# Patient Record
Sex: MALE | Race: WHITE | NOT HISPANIC OR LATINO | Employment: OTHER | URBAN - METROPOLITAN AREA
[De-identification: names, ages, dates, MRNs, and addresses within clinical notes are randomized per-mention and may not be internally consistent; named-entity substitution may affect disease eponyms.]

---

## 2017-01-03 ENCOUNTER — OFFICE VISIT (OUTPATIENT)
Dept: PALLIATIVE MEDICINE | Facility: CLINIC | Age: 67
End: 2017-01-03
Payer: MEDICARE

## 2017-01-03 VITALS
HEART RATE: 80 BPM | DIASTOLIC BLOOD PRESSURE: 71 MMHG | HEIGHT: 73 IN | BODY MASS INDEX: 27.04 KG/M2 | WEIGHT: 204 LBS | SYSTOLIC BLOOD PRESSURE: 127 MMHG

## 2017-01-03 DIAGNOSIS — G89.29 CHRONIC PAIN OF RIGHT KNEE: Primary | ICD-10-CM

## 2017-01-03 DIAGNOSIS — M54.2 CHRONIC NECK PAIN: ICD-10-CM

## 2017-01-03 DIAGNOSIS — G89.29 CHRONIC NECK PAIN: ICD-10-CM

## 2017-01-03 DIAGNOSIS — M25.561 CHRONIC PAIN OF RIGHT KNEE: Primary | ICD-10-CM

## 2017-01-03 PROCEDURE — 99214 OFFICE O/P EST MOD 30 MIN: CPT | Performed by: NURSE PRACTITIONER

## 2017-01-03 ASSESSMENT — PAIN SCALES - GENERAL: PAINLEVEL: MILD PAIN (3)
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
## 2017-01-03 NOTE — MR AVS SNAPSHOT
After Visit Summary   1/3/2017    Liliya Mendez    MRN: [id number]           Patient Information     Date Of Birth          1950        Visit Information        Provider Department      1/3/2017 10:00 AM Ness Sim APRN [facility]        Today's Diagnoses     Chronic pain of right knee    -  1     Chronic neck pain           Care Instructions    PLAN:  1. Continue outside PHYSICAL THERAPY for the right knee  2. Medications:   1. Trial Voltaren gel to knees  2. May also trial Aspercreme with 4% lidocaine cream; this is found over-the-counter and may be used according to package instructions for topical pain relief  3. Procedures: may consider right knee genicular nerve block, call me if you wish to proceed  4. Follow-up with me in 8-10 weeks.      Nurse Triage line:  [phone]   Call this number with any questions or concerns. You may leave a detailed message anytime. Calls are typically returned Monday through Friday between 8 AM and 4:30 PM. We usually get back to you within 2 business days depending on the issue/request.       Medication refills:    For non-narcotic medications, call your pharmacy directly to request a refill. The pharmacy will contact the [facility] Management Center for authorization. Please allow 3-4 days for these refills to be processed.     For narcotic refills, call the nurse triage line or send a [employer] message. Please contact us 7-10 days before your refill is due. The message MUST include the name of the specific medication(s) requested and how you would like to receive the prescription(s). The options are as follows:    Pain Clinic staff can mail the prescription to your pharmacy. Please tell us the name of the pharmacy.    You may pick the prescription up at the Pain Clinic (tell us the location) or during a clinic visit with your pain provider    Pain Clinic staff can deliver the prescription to the [facility] pharmacy in the clinic building.  "Please tell us the location.      Scheduling number: [phone].  Call this number to schedule or change appointments.    We believe regular attendance is key to your success in our program.    Any time you are unable to keep your appointment we ask that you call us at least 24 hours in advance to let us know. This will allow us to offer the appointment time to another patient.             Follow-ups after your visit        Who to contact     If you have questions or need follow up information about today's clinic visit or your schedule please contact [facility] [place] directly at [phone].  Normal or non-critical lab and imaging results will be communicated to you by Calerahart, letter or phone within 4 business days after the clinic has received the results. If you do not hear from us within 7 days, please contact the clinic through Covarityt or phone. If you have a critical or abnormal lab result, we will notify you by phone as soon as possible.  Submit refill requests through Moka or call your pharmacy and they will forward the refill request to us. Please allow 3 business days for your refill to be completed.          Additional Information About Your Visit        Calerahart Information     Moka gives you secure access to your electronic health record. If you see a primary care provider, you can also send messages to your care team and make appointments. If you have questions, please call your primary care clinic.  If you do not have a primary care provider, please call [phone] and they will assist you.        Care EveryWhere ID     This is your Care EveryWhere ID. This could be used by other organizations to access your [place] medical records  [id number]        Your Vitals Were     Pulse Height BMI (Body Mass Index)             80 1.854 m (6' 1\") 26.92 kg/m2          Blood Pressure from Last 3 Encounters:   01/03/17 127/71   04/29/16 129/79   01/26/16 133/85    Weight from Last 3 Encounters: "   01/03/17 92.534 kg (204 lb)   04/29/16 90.266 kg (199 lb)   01/26/16 95.709 kg (211 lb)              Today, you had the following     No orders found for display         Today's Medication Changes          These changes are accurate as of: 1/3/17 11:02 AM.  If you have any questions, ask your nurse or doctor.               Start taking these medicines.        Dose/Directions    diclofenac 1 % Gel topical gel   Commonly known as:  VOLTAREN   Used for:  Chronic pain of right knee        Apply 4 grams to knees up to  four times daily as needed  using enclosed dosing card.   Quantity:  100 g   Refills:  1            Where to get your medicines      These medications were sent to [id number] - [place], [place] - [address]  [address] [address]     Phone:  [phone]    - diclofenac 1 % Gel topical gel             Primary Care Provider Office Phone # Fax #    Derrick Hansen [phone]       [facility] [address] [facility] [address]        Thank you!     Thank you for choosing [facility]  for your care. Our goal is always to provide you with excellent care. Hearing back from our patients is one way we can continue to improve our services. Please take a few minutes to complete the written survey that you may receive in the mail after your visit with us. Thank you!             Your Updated Medication List - Protect others around you: Learn how to safely use, store and throw away your medicines at www.disposemymeds.org.          This list is accurate as of: 1/3/17 11:02 AM.  Always use your most recent med list.                   Brand Name Dispense Instructions for use    aspirin 81 MG tablet      1 TABLET DAILY       B COMPLEX 1 PO      1 tablet daily       DEPAKOTE 500 MG EC tablet   Generic drug:  divalproex     60 tablet    Take 1 tablet (500 mg) by mouth 2 times daily       diclofenac 1 % Gel topical gel    VOLTAREN     100 g    Apply 4 grams to knees up to  four times daily as needed  using enclosed dosing card.       LAMOTRIGINE PO      Take 25 mg by mouth daily       losartan 25 MG tablet    COZAAR    90 tablet    Take 1 tablet (25 mg) by mouth daily       simvastatin 10 MG tablet    ZOCOR    90 tablet    Take 1 tablet (10 mg) by mouth daily       traMADol 50 MG tablet    ULTRAM    20 tablet    Take 1/2 to 1 tablet every 6 hours as needed for severe pain, begin with lowest dose (1/2 tab)       VITAMIN D PO      once daily

## 2017-01-03 NOTE — PATIENT INSTRUCTIONS
PLAN:  1. Continue outside PHYSICAL THERAPY for the right knee  2. Medications:   1. Trial Voltaren gel to knees  2. May also trial Aspercreme with 4% lidocaine cream; this is found over-the-counter and may be used according to package instructions for topical pain relief  3. Procedures: may consider right knee genicular nerve block, call me if you wish to proceed  4. Follow-up with me in 8-10 weeks.      Nurse Triage line:  [phone]   Call this number with any questions or concerns. You may leave a detailed message anytime. Calls are typically returned Monday through Friday between 8 AM and 4:30 PM. We usually get back to you within 2 business days depending on the issue/request.       Medication refills:    For non-narcotic medications, call your pharmacy directly to request a refill. The pharmacy will contact the [facility] Center for authorization. Please allow 3-4 days for these refills to be processed.     For narcotic refills, call the nurse triage line or send a youmag message. Please contact us 7-10 days before your refill is due. The message MUST include the name of the specific medication(s) requested and how you would like to receive the prescription(s). The options are as follows:    Pain Clinic staff can mail the prescription to your pharmacy. Please tell us the name of the pharmacy.    You may pick the prescription up at the Pain Clinic (tell us the location) or during a clinic visit with your pain provider    Pain Clinic staff can deliver the prescription to the [facility] pharmacy in the clinic building. Please tell us the location.      Scheduling number: [phone].  Call this number to schedule or change appointments.    We believe regular attendance is key to your success in our program.    Any time you are unable to keep your appointment we ask that you call us at least 24 hours in advance to let us know. This will allow us to offer the appointment time to another patient.

## 2017-01-03 NOTE — NURSING NOTE
"Chief Complaint   Patient presents with     Pain     Follow up        Initial /71 mmHg  Pulse 80  Ht 1.854 m (6' 1\")  Wt 92.534 kg (204 lb)  BMI 26.92 kg/m2 Estimated body mass index is 26.92 kg/(m^2) as calculated from the following:    Height as of this encounter: 1.854 m (6' 1\").    Weight as of this encounter: 92.534 kg (204 lb).  BP completed using cuff size: regular    Edgardo Mcneill MA      "

## 2017-06-21 ENCOUNTER — OFFICE VISIT (OUTPATIENT)
Dept: PALLIATIVE MEDICINE | Facility: CLINIC | Age: 67
End: 2017-06-21
Payer: MEDICARE

## 2017-06-21 VITALS
BODY MASS INDEX: 27.05 KG/M2 | DIASTOLIC BLOOD PRESSURE: 95 MMHG | WEIGHT: 205 LBS | HEART RATE: 65 BPM | SYSTOLIC BLOOD PRESSURE: 156 MMHG

## 2017-06-21 DIAGNOSIS — M79.18 MYOFASCIAL PAIN: ICD-10-CM

## 2017-06-21 DIAGNOSIS — M47.812 CERVICAL SPONDYLOSIS WITHOUT MYELOPATHY: ICD-10-CM

## 2017-06-21 DIAGNOSIS — M54.2 CHRONIC NECK PAIN: Primary | ICD-10-CM

## 2017-06-21 DIAGNOSIS — G89.29 CHRONIC NONINTRACTABLE HEADACHE, UNSPECIFIED HEADACHE TYPE: ICD-10-CM

## 2017-06-21 DIAGNOSIS — M54.81 OCCIPITAL NEURALGIA, UNSPECIFIED LATERALITY: ICD-10-CM

## 2017-06-21 DIAGNOSIS — G89.29 CHRONIC NECK PAIN: Primary | ICD-10-CM

## 2017-06-21 DIAGNOSIS — R51.9 CHRONIC NONINTRACTABLE HEADACHE, UNSPECIFIED HEADACHE TYPE: ICD-10-CM

## 2017-06-21 PROCEDURE — 99214 OFFICE O/P EST MOD 30 MIN: CPT | Performed by: NURSE PRACTITIONER

## 2017-06-21 ASSESSMENT — PAIN SCALES - GENERAL: PAINLEVEL: MILD PAIN (3)

## 2017-06-21 NOTE — NURSING NOTE
"Chief Complaint   Patient presents with     Pain       Initial BP (!) 156/95  Pulse 65  Wt 93 kg (205 lb)  BMI 27.05 kg/m2 Estimated body mass index is 27.05 kg/(m^2) as calculated from the following:    Height as of 1/3/17: 1.854 m (6' 1\").    Weight as of this encounter: 93 kg (205 lb).  Medication Reconciliation: complete     Edgardo Mcneill MA      "

## 2017-06-21 NOTE — MR AVS SNAPSHOT
After Visit Summary   6/21/2017    Liliya Mendez    MRN: [id number]           Patient Information     Date Of Birth          1950        Visit Information        Provider Department      6/21/2017 8:30 AM Ness Sim APRN [facility]        Today's Diagnoses     Chronic neck pain    -  1    Cervical spondylosis without myelopathy        Chronic nonintractable headache, unspecified headache type          Care Instructions    PLAN:  1. Continue home activities   2. Medications:   1. No medication changes, let me know if you need Voltaren gel  3. Procedures: none at present. If right sided headache remains, call or mychart me and I will place an order for occipital nerve block as discussed for headache pain  4. Follow-up with me in 12-16 weeks.       ----------------------------------------------------------------  Nurse Triage line:  [phone]   Call this number with any questions or concerns. You may leave a detailed message anytime. Calls are typically returned Monday through Friday between 8 AM and 4:30 PM. We usually get back to you within 2 business days depending on the issue/request.       Medication refills:    For non-narcotic medications, call your pharmacy directly to request a refill. The pharmacy will contact the [facility] Management Center for authorization. Please allow 3-4 days for these refills to be processed.     For narcotic refills, call the nurse triage line or send a [employer] message. Please contact us 7-10 days before your refill is due. The message MUST include the name of the specific medication(s) requested and how you would like to receive the prescription(s). The options are as follows:    Pain Clinic staff can mail the prescription to your pharmacy. Please tell us the name of the pharmacy.    You may pick the prescription up at the Pain Clinic (tell us the location) or during a clinic visit with your pain provider    Pain Clinic staff can deliver  the prescription to the [facility] pharmacy in the clinic building. Please tell us the location.      Scheduling number: [phone].  Call this number to schedule or change appointments.    We believe regular attendance is key to your success in our program.    Any time you are unable to keep your appointment we ask that you call us at least 24 hours in advance to let us know. This will allow us to offer the appointment time to another patient.               Follow-ups after your visit        Who to contact     If you have questions or need follow up information about today's clinic visit or your schedule please contact [facility] [place] directly at [phone].  Normal or non-critical lab and imaging results will be communicated to you by MyChart, letter or phone within 4 business days after the clinic has received the results. If you do not hear from us within 7 days, please contact the clinic through [employer] or phone. If you have a critical or abnormal lab result, we will notify you by phone as soon as possible.  Submit refill requests through Infoblox or call your pharmacy and they will forward the refill request to us. Please allow 3 business days for your refill to be completed.          Additional Information About Your Visit        Infoblox Information     Infoblox gives you secure access to your electronic health record. If you see a primary care provider, you can also send messages to your care team and make appointments. If you have questions, please call your primary care clinic.  If you do not have a primary care provider, please call [phone] and they will assist you.        Care EveryWhere ID     This is your Care EveryWhere ID. This could be used by other organizations to access your [facility] medical records  [id number]        Your Vitals Were     Pulse BMI (Body Mass Index)                65 27.05 kg/m2           Blood Pressure from Last 3 Encounters:   06/21/17 (!) 156/95   01/03/17  127/71   04/29/16 129/79    Weight from Last 3 Encounters:   06/21/17 93 kg (205 lb)   01/03/17 92.5 kg (204 lb)   04/29/16 90.3 kg (199 lb)              Today, you had the following     No orders found for display       Primary Care Provider Office Phone # Fax #    Derrick Hansen [phone]       [facility] [address]  [place] [address]        Equal Access to Services     [facility] Hadii aad ku hadasho Soomaali, waaxda luqadaha, qaybta kaalmada adeegyada, waxay idiin hayaan adeeg kharasabiha labill . So [facility] [phone].    ATENCIÓN: Si habla español, tiene a phipps disposición servicios gratuitos de asistencia lingüística. Llame al [phone].    We comply with applicable federal civil rights laws and [place] laws. We do not discriminate on the basis of race, color, national origin, age, disability sex, sexual orientation or gender identity.            Thank you!     Thank you for choosing [facility]  for your care. Our goal is always to provide you with excellent care. Hearing back from our patients is one way we can continue to improve our services. Please take a few minutes to complete the written survey that you may receive in the mail after your visit with us. Thank you!             Your Updated Medication List - Protect others around you: Learn how to safely use, store and throw away your medicines at www.disposemymeds.org.          This list is accurate as of: 6/21/17  9:07 AM.  Always use your most recent med list.                   Brand Name Dispense Instructions for use Diagnosis    aspirin 81 MG tablet      1 TABLET DAILY        B COMPLEX 1 PO      1 tablet daily        DEPAKOTE 500 MG EC tablet   Generic drug:  divalproex     60 tablet    Take 1 tablet (500 mg) by mouth 2 times daily        diclofenac 1 % Gel topical gel    VOLTAREN    100 g    Apply 4 grams to knees up to  four times daily as needed  using enclosed dosing card.    Chronic  pain of right knee       LAMOTRIGINE PO      Take 25 mg by mouth daily        losartan 25 MG tablet    COZAAR    90 tablet    Take 1 tablet (25 mg) by mouth daily    Elevated blood pressure (not hypertension), Adjustment disorder with mixed anxiety and depressed mood       simvastatin 10 MG tablet    ZOCOR    90 tablet    Take 1 tablet (10 mg) by mouth daily    Other and unspecified hyperlipidemia       traMADol 50 MG tablet    ULTRAM    20 tablet    Take 1/2 to 1 tablet every 6 hours as needed for severe pain, begin with lowest dose (1/2 tab)    Chronic neck pain, Myofascial pain       VITAMIN D PO      once daily
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
## 2017-06-21 NOTE — PATIENT INSTRUCTIONS
PLAN:  1. Continue home activities   2. Medications:   1. No medication changes, let me know if you need Voltaren gel  3. Procedures: none at present. If right sided headache remains, call or mychart me and I will place an order for occipital nerve block as discussed for headache pain  4. Follow-up with me in 12-16 weeks.       ----------------------------------------------------------------  Nurse Triage line:  [phone]   Call this number with any questions or concerns. You may leave a detailed message anytime. Calls are typically returned Monday through Friday between 8 AM and 4:30 PM. We usually get back to you within 2 business days depending on the issue/request.       Medication refills:    For non-narcotic medications, call your pharmacy directly to request a refill. The pharmacy will contact the [facility] for authorization. Please allow 3-4 days for these refills to be processed.     For narcotic refills, call the nurse triage line or send a AdoTube message. Please contact us 7-10 days before your refill is due. The message MUST include the name of the specific medication(s) requested and how you would like to receive the prescription(s). The options are as follows:    Pain Clinic staff can mail the prescription to your pharmacy. Please tell us the name of the pharmacy.    You may pick the prescription up at the Pain Clinic (tell us the location) or during a clinic visit with your pain provider    Pain Clinic staff can deliver the prescription to the [facility] pharmacy in the clinic building. Please tell us the location.      Scheduling number: [phone].  Call this number to schedule or change appointments.    We believe regular attendance is key to your success in our program.    Any time you are unable to keep your appointment we ask that you call us at least 24 hours in advance to let us know. This will allow us to offer the appointment time to another patient.

## 2017-10-02 ENCOUNTER — MYC MEDICAL ADVICE (OUTPATIENT)
Dept: PALLIATIVE MEDICINE | Facility: CLINIC | Age: 67
End: 2017-10-02

## 2017-10-04 NOTE — TELEPHONE ENCOUNTER
My chart message from pt:    Hi Ness, I hope things are going good for you. I'm still having major daily issues with vertigo, I'm also having more pain with my neck issue. Therapy  for the vertigo did no good at all. The nausea, dizziness and major headache are multiple daily issues. A resent MRI showed everything was normal. The headaches last all day every day and are on the back of my head, the same side as my neck problems, I know you don't deal with vertigo but do you think my neck problems could be associated with these vertigo issues. After 7 months of this I'm getting pretty desperate to try and figure anything out that could help me. I can certainly come in to see you if you think it's better for us to meet. I know you always take care of me and I always enjoy talking with you even though I'm there for health issues. So if you think you might have any thoughts on this please let me know. Thank you Ness and take care.    Liliya     Pt was last seen on 6/21/2017:  PLAN:  1. Continue home activities   2. Medications:   1. No medication changes, let me know if you need Voltaren gel  3. Procedures: none at present. If right sided headache remains, call or mychart me and I will place an order for occipital nerve block as discussed for headache pain  4. Follow-up with me in 12-16 weeks.   No future appointments listed. - Before respond to pt will send to provider as not sure how to respond to the vertigo questions.    Rajani Westbrook RN, [facility]  Pain Clinic Care Coordinator

## 2017-10-04 NOTE — TELEPHONE ENCOUNTER
Khadar Lovell-  Where did you attend therapy for the vertigo? I have some ideas about different therapy depending on whom you have seen before.   Also, is the headache pain similar to the pain you were having the last time I saw you? If so, we could begin with having you come in to see one of my partners for an occipital nerve block to see if that helps the headache at all.    If pain is different, then I think I should see you first.   Also. Please keep a headache diary including how long each headache lasts and any associated symptoms such as nausea, vomiting, sensitivity to light or sensitivity to sound, etc. As there are some other therapies that need this information first before we can proceed.     Thanks, and hang in there!  Ness HERBERT RN CNP, BRISA  [facility]    I sent the above Georama message to the patient.  Ness HERBERT RN CNP, BRISA  [facility]

## 2017-10-09 NOTE — TELEPHONE ENCOUNTER
Morgan sent to pt:  From: Liliya Mendez      Created: 10/8/2017 7:00 AM      Good morning Ness, thank you for the reply. Just a update, Dr. Wright sent me to [facility], it's right off [address]. I went through some testing Thursday and Friday. They think it might be related to migraines and vertigo but won't know till they start therapy 10/16. they also said the muscles on my neck on the right side are really tight which might contribute to the dizziness, nausea and headaches. They'll do some neck therapy along with some other therapy's to see what works best for me. My primary DR. Carter gave me some Sumatritan Succinate for the headaches which don't work. Would you know of anything else that I might try. I don't want to try a block until I try therapy for awhile to see if they can pin point what might be causing these issues. Might there be changes in my neck that could cause these issues. The headaches are on the right side as we discussed on my last visit always start on the lower right side then can migrate all over. As  you know I can't take pain meds. as they make me nauseated. I'm sorry for bothering you as I know how busy you are but really respect your opinions when it comes to my health. Have a great day Zachary Lovell      Routed to Ness Sim NP.    Chichi Beatty, MISAEL-BSN  [facility]

## 2017-10-11 NOTE — TELEPHONE ENCOUNTER
Khadar Lovell-    I am so happy to hear you are being seen at the [facility]. They do great work.    There are other migraine medications out there as well. Have you tried anything other than the Sumatriptan?     We could also get a new MRI of your neck as you have not had one done since 12/1/2014.      Also, I think it would be OK to start with the occipital nerve blocks as doing this may benefit your headaches and should not interfere with any therapy, just help to turn down headache pain from the back of your head.     We have tried medial branch blocks in the past in the neck (injections into the facet joints), but this was not very helpful. I would not entertain repeating this without an updated MRI of your neck to see what, if anything, has changed.     And, you are not bothering me at all. Thanks for checking inLiliya.    Best regards,  Ness HERBERT RN CNP, [place]  [facility] Center[facility]hadar Joseph I sent the above [employer] message to the patient.  Ness HERBERT RN CNP, GEORGINAP  [facility]

## 2017-10-18 NOTE — TELEPHONE ENCOUNTER
Pt read message on 10/11/2017 and no response back closing encounter.     Rajani Westbrook RN, [facility]  Pain Clinic Care Coordinator

## 2017-10-30 ENCOUNTER — MYC MEDICAL ADVICE (OUTPATIENT)
Dept: PALLIATIVE MEDICINE | Facility: CLINIC | Age: 67
End: 2017-10-30

## 2017-10-30 DIAGNOSIS — G89.29 CHRONIC NECK PAIN: Primary | ICD-10-CM

## 2017-10-30 DIAGNOSIS — M54.2 CHRONIC NECK PAIN: Primary | ICD-10-CM

## 2017-10-30 DIAGNOSIS — M47.812 CERVICAL SPONDYLOSIS WITHOUT MYELOPATHY: ICD-10-CM

## 2017-10-31 NOTE — TELEPHONE ENCOUNTER
My chart message from pt:    Khadar Arzola, you mentioned maybe having a MRI on my neck, I m thinking that might not be a bad idea. Do I need to see you first. ThankYou and have a great day     Message on October 11, 2017:    We could also get a new MRI of your neck as you have not had one done since 12/1/2014.       Sending to provider for order.     Rajani Westbrook RN, [facility]  Pain Clinic Care Coordinator

## 2017-11-07 NOTE — TELEPHONE ENCOUNTER
Khadar Lovell-    You are having ongoing back of the neck pain, correct? Do you have any heart or ear metal implants? Are you claustrophobic? Once I have these answers I will place an order for the neck MRI, it can be completed at [facility].    Thanks.  Ness HERBERT, RN CNP, FNP  [facility]    I sent the above Brainliket message to the patient.  Ness HERBERT RN CNP, FNP  [facility] [place]

## 2017-11-08 NOTE — TELEPHONE ENCOUNTER
Per patient myChart message:  From: Liliya Mendez      Created: 11/8/2017 3:28 PM      Hi Ness, yes it's the same neck issues only getting worse. I've had two MRI's of my head in the last 3 months to rule out any conditions related to my vertigo so I'm good to go. No implants, I'm not claustrophobic. Thanks Ness it will be interesting to see what has changed. Thank you for the reply. Have a great day.   Liliya      Routed to provider.    Chichi Beatty, RN-BSN  [facility]

## 2017-11-10 NOTE — TELEPHONE ENCOUNTER
Khadar Joseph I have signed the order for a cervical MRI to be done at [facility]. Please call  to schedule. Thanks.    Ness HERBERT RN CNP, [place]  [facility]    I have sent the above Post[employer]t message to the patient.     Ness HERBERT RN CNP, GE[place]  [facility] [place]

## 2017-11-16 ENCOUNTER — RADIANT APPOINTMENT (OUTPATIENT)
Dept: MRI IMAGING | Facility: CLINIC | Age: 67
End: 2017-11-16
Attending: NURSE PRACTITIONER
Payer: MEDICARE

## 2017-11-16 DIAGNOSIS — M47.812 CERVICAL SPONDYLOSIS WITHOUT MYELOPATHY: ICD-10-CM

## 2017-11-16 DIAGNOSIS — M54.2 CHRONIC NECK PAIN: ICD-10-CM

## 2017-11-16 DIAGNOSIS — G89.29 CHRONIC NECK PAIN: ICD-10-CM

## 2017-11-16 PROCEDURE — 72141 MRI NECK SPINE W/O DYE: CPT | Mod: TC

## 2017-11-22 ENCOUNTER — MYC MEDICAL ADVICE (OUTPATIENT)
Dept: PALLIATIVE MEDICINE | Facility: CLINIC | Age: 67
End: 2017-11-22

## 2017-11-22 NOTE — TELEPHONE ENCOUNTER
Per patient myChart message:  From: Liliya Mendez      Created: 11/22/2017 3:01 PM      Khadar Arzola, I had my MRI last week and haven't heard anything about the results, have you heard anything yet. Thank you Ness and have a wonderful Thanksgiving.   Liliya        Routed to provider.    Chichi Beatty, RN-BSN  [facility]

## 2017-11-24 NOTE — TELEPHONE ENCOUNTER
Khadar Lovell-    You have some pretty significant arthritis in multiple levels in your neck, most notable at C3-4 and C6-7. At C6-7, you have moderate spinal canal stenosisi (this means that there is less room for the spinal cord to pass through the canal, so there is some pressure on the spinal cord). At this same level, there is also pretty severe neural foraminal stenosis (meaning that the openings where the spinal nerves exit have less room to exit).   However, there has not been much interval change since the previous MRI, which I think is reasonable news.     We could do either a trial of a cervical epidural steroid injection OR I can send you to see a neurosurgeon to get their professional opinion. I do think that what is going on in your neck is likely causing your numbness and tingling in the arms/hands.     Let me know if you have questions or your preferences on how to proceed.     Thanks.  Ness HERBERT RN CNP, [place]  [facility]    I have sent the above [email or web address] message to the patient.     Ness HERBERT RN CNP, [place]  [facility]

## 2017-11-27 NOTE — TELEPHONE ENCOUNTER
My chart response from pt:    Khadar Arzola, I m going to make a appointment with you so we can sit and talk, I know you can get booked out but I d feel just to come in. ThankYou and I ll see you soon.  Liliya

## 2017-12-05 ENCOUNTER — OFFICE VISIT (OUTPATIENT)
Dept: PALLIATIVE MEDICINE | Facility: CLINIC | Age: 67
End: 2017-12-05
Payer: MEDICARE

## 2017-12-05 VITALS — HEART RATE: 54 BPM | SYSTOLIC BLOOD PRESSURE: 153 MMHG | DIASTOLIC BLOOD PRESSURE: 87 MMHG

## 2017-12-05 DIAGNOSIS — G89.29 CHRONIC NONINTRACTABLE HEADACHE, UNSPECIFIED HEADACHE TYPE: ICD-10-CM

## 2017-12-05 DIAGNOSIS — R51.9 CHRONIC NONINTRACTABLE HEADACHE, UNSPECIFIED HEADACHE TYPE: ICD-10-CM

## 2017-12-05 DIAGNOSIS — M50.30 DDD (DEGENERATIVE DISC DISEASE), CERVICAL: ICD-10-CM

## 2017-12-05 DIAGNOSIS — G89.29 CHRONIC NECK PAIN: Primary | ICD-10-CM

## 2017-12-05 DIAGNOSIS — M79.18 MYOFASCIAL PAIN: ICD-10-CM

## 2017-12-05 DIAGNOSIS — M47.812 CERVICAL SPONDYLOSIS WITHOUT MYELOPATHY: ICD-10-CM

## 2017-12-05 DIAGNOSIS — M54.2 CHRONIC NECK PAIN: Primary | ICD-10-CM

## 2017-12-05 PROCEDURE — 99214 OFFICE O/P EST MOD 30 MIN: CPT | Performed by: NURSE PRACTITIONER

## 2017-12-05 ASSESSMENT — PAIN SCALES - GENERAL: PAINLEVEL: MODERATE PAIN (4)
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
## 2017-12-05 NOTE — MR AVS SNAPSHOT
After Visit Summary   12/5/2017    Liliya Mendez    MRN: [id number]           Patient Information     Date Of Birth          1950        Visit Information        Provider Department      12/5/2017 11:00 AM Ness Sim APRN [facility] Instructions    PLAN:   1. Medications:   1. Continue lamotrigine as prescribed by your neurologist  2. Take Migrelief as recommended on the bottle, can find at [employer] or [employer]  2. Procedures: None at present. Let me know if there is worsening numbness and tingling, and I can place an order for Neurosurgery referral.   3. Ask your seizure doctor if they are okay with start of low dose Topamax. This is used for seizures and migraine prevention.   4. Follow-up with me in 10 weeks.     ----------------------------------------------------------------  Nurse Triage line:  [phone]   Call this number with any questions or concerns. You may leave a detailed message anytime. Calls are typically returned Monday through Friday between 8 AM and 4:30 PM. We usually get back to you within 2 business days depending on the issue/request.       Medication refills:    For non-narcotic medications, call your pharmacy directly to request a refill. The pharmacy will contact the [facility] for authorization. Please allow 3-4 days for these refills to be processed.     For narcotic refills, call the nurse triage line or send a Paystik message. Please contact us 7-10 days before your refill is due. The message MUST include the name of the specific medication(s) requested and how you would like to receive the prescription(s). The options are as follows:    Pain Clinic staff can mail the prescription to your pharmacy. Please tell us the name of the pharmacy.    You may pick the prescription up at the Pain Clinic (tell us the location) or during a clinic visit with your pain provider    Pain Clinic staff can deliver the prescription  to the [facility] pharmacy in the clinic building. Please tell us the location.      Scheduling number: [phone].  Call this number to schedule or change appointments.    We believe regular attendance is key to your success in our program.    Any time you are unable to keep your appointment we ask that you call us at least 24 hours in advance to let us know. This will allow us to offer the appointment time to another patient.               Follow-ups after your visit        Who to contact     If you have questions or need follow up information about today's clinic visit or your schedule please contact [facility] directly at [phone].  Normal or non-critical lab and imaging results will be communicated to you by Rekoohart, letter or phone within 4 business days after the clinic has received the results. If you do not hear from us within 7 days, please contact the clinic through ShipBobt or phone. If you have a critical or abnormal lab result, we will notify you by phone as soon as possible.  Submit refill requests through UserEvents or call your pharmacy and they will forward the refill request to us. Please allow 3 business days for your refill to be completed.          Additional Information About Your Visit        Rekoohart Information     UserEvents gives you secure access to your electronic health record. If you see a primary care provider, you can also send messages to your care team and make appointments. If you have questions, please call your primary care clinic.  If you do not have a primary care provider, please call [phone] and they will assist you.        Care EveryWhere ID     This is your Care EveryWhere ID. This could be used by other organizations to access your [facility] medical records  [id number]        Your Vitals Were     Pulse                   54            Blood Pressure from Last 3 Encounters:   12/05/17 153/87   06/21/17 (!) 156/95   01/03/17 127/71    Weight from Last 3  Encounters:   06/21/17 93 kg (205 lb)   01/03/17 92.5 kg (204 lb)   04/29/16 90.3 kg (199 lb)              Today, you had the following     No orders found for display         Today's Medication Changes          These changes are accurate as of: 12/5/17 11:57 AM.  If you have any questions, ask your nurse or doctor.               Stop taking these medicines if you haven't already. Please contact your care team if you have questions.     traMADol 50 MG tablet   Commonly known as:  ULTRAM                    Primary Care Provider Office Phone # Fax #    Derrick Hansen [phone]       [address] [place] [address]        Equal Access to Services     ACLEB JAVIER : Hadii char sherwoodo Sobryant, waaxda luqadaha, qaybta kaalmada adelorenayada, gonzalo andre . So [facility] [phone].    ATENCIÓN: Si habla español, tiene a phipps disposición servicios gratuitos de asistencia lingüística. Llame al [phone].    We comply with applicable federal civil rights laws and [place] laws. We do not discriminate on the basis of race, color, national origin, age, disability, sex, sexual orientation, or gender identity.            Thank you!     Thank you for choosing [facility]  for your care. Our goal is always to provide you with excellent care. Hearing back from our patients is one way we can continue to improve our services. Please take a few minutes to complete the written survey that you may receive in the mail after your visit with us. Thank you!             Your Updated Medication List - Protect others around you: Learn how to safely use, store and throw away your medicines at www.disposemymeds.org.          This list is accurate as of: 12/5/17 11:57 AM.  Always use your most recent med list.                   Brand Name Dispense Instructions for use Diagnosis    aspirin 81 MG tablet      1 TABLET DAILY        B COMPLEX 1 PO      1 tablet daily        CO Q 10  PO           diclofenac 1 % Gel topical gel    VOLTAREN    100 g    Apply 4 grams to knees up to  four times daily as needed  using enclosed dosing card.    Chronic pain of right knee       LAMOTRIGINE PO      Take 25 mg by mouth daily        losartan 25 MG tablet    COZAAR    90 tablet    Take 1 tablet (25 mg) by mouth daily    Elevated blood pressure (not hypertension), Adjustment disorder with mixed anxiety and depressed mood       MAGNESIUM OXIDE PO           simvastatin 10 MG tablet    ZOCOR    90 tablet    Take 1 tablet (10 mg) by mouth daily    Other and unspecified hyperlipidemia       VITAMIN D PO      once daily

## 2017-12-05 NOTE — PATIENT INSTRUCTIONS
PLAN:   1. Medications:   1. Continue lamotrigine as prescribed by your neurologist  2. Take Migrelief as recommended on the bottle, can find at [employer] or [employer]  2. Procedures: None at present. Let me know if there is worsening numbness and tingling, and I can place an order for Neurosurgery referral.   3. Ask your seizure doctor if they are okay with start of low dose Topamax. This is used for seizures and migraine prevention.   4. Follow-up with me in 10 weeks.     ----------------------------------------------------------------  Nurse Triage line:  [phone]   Call this number with any questions or concerns. You may leave a detailed message anytime. Calls are typically returned Monday through Friday between 8 AM and 4:30 PM. We usually get back to you within 2 business days depending on the issue/request.       Medication refills:    For non-narcotic medications, call your pharmacy directly to request a refill. The pharmacy will contact the [facility] for authorization. Please allow 3-4 days for these refills to be processed.     For narcotic refills, call the nurse triage line or send a Waggl message. Please contact us 7-10 days before your refill is due. The message MUST include the name of the specific medication(s) requested and how you would like to receive the prescription(s). The options are as follows:    Pain Clinic staff can mail the prescription to your pharmacy. Please tell us the name of the pharmacy.    You may pick the prescription up at the Pain Clinic (tell us the location) or during a clinic visit with your pain provider    Pain Clinic staff can deliver the prescription to the [facility] pharmacy in the clinic building. Please tell us the location.      Scheduling number: [phone].  Call this number to schedule or change appointments.    We believe regular attendance is key to your success in our program.    Any time you are unable to keep your appointment we ask that  you call us at least 24 hours in advance to let us know. This will allow us to offer the appointment time to another patient.

## 2017-12-05 NOTE — NURSING NOTE
"Chief Complaint   Patient presents with     Pain       Initial BP (!) 167/94  Pulse 54 Estimated body mass index is 27.05 kg/(m^2) as calculated from the following:    Height as of 1/3/17: 1.854 m (6' 1\").    Weight as of 6/21/17: 93 kg (205 lb).  Medication Reconciliation: riya Garza CMA (AAMA)      "

## 2018-12-01 ENCOUNTER — HEALTH MAINTENANCE LETTER (OUTPATIENT)
Age: 68
End: 2018-12-01

## 2023-06-14 ENCOUNTER — TRANSFERRED RECORDS (OUTPATIENT)
Dept: HEALTH INFORMATION MANAGEMENT | Facility: CLINIC | Age: 73
End: 2023-06-14
Payer: COMMERCIAL

## 2023-06-17 ENCOUNTER — OFFICE VISIT (OUTPATIENT)
Dept: URGENT CARE | Facility: URGENT CARE | Age: 73
End: 2023-06-17
Payer: MEDICARE

## 2023-06-17 VITALS
HEIGHT: 73 IN | OXYGEN SATURATION: 100 % | TEMPERATURE: 98.4 F | WEIGHT: 194.1 LBS | SYSTOLIC BLOOD PRESSURE: 163 MMHG | DIASTOLIC BLOOD PRESSURE: 87 MMHG | BODY MASS INDEX: 25.72 KG/M2 | RESPIRATION RATE: 16 BRPM | HEART RATE: 57 BPM

## 2023-06-17 DIAGNOSIS — L29.9 ITCHING: ICD-10-CM

## 2023-06-17 DIAGNOSIS — S30.860A TICK BITE OF BACK, INITIAL ENCOUNTER: Primary | ICD-10-CM
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
## 2023-06-17 PROCEDURE — 99203 OFFICE O/P NEW LOW 30 MIN: CPT | Performed by: FAMILY MEDICINE

## 2023-06-17 RX ORDER — DOXYCYCLINE 100 MG/1
100 CAPSULE ORAL 2 TIMES DAILY
Qty: 20 CAPSULE | Refills: 0 | Status: SHIPPED | OUTPATIENT
Start: 2023-06-17 | End: 2023-06-27

## 2023-06-17 RX ORDER — TRIAMCINOLONE ACETONIDE 5 MG/G
CREAM TOPICAL 2 TIMES DAILY
Qty: 15 G | Refills: 0 | Status: SHIPPED | OUTPATIENT
Start: 2023-06-17

## 2023-06-17 NOTE — PROGRESS NOTES
"  Assessment & Plan     Tick bite of back, initial encounter  72-year-old male presented with tick bite involving back, unsure about the type and believe that must have engorged for few days.  Management options discussed.  Doxycycline prescribed to cover Lyme's disease and recommended to use Kenalog for itching.  Return criteria explained in detail.  All questions answered.  - doxycycline hyclate (VIBRAMYCIN) 100 MG capsule; Take 1 capsule (100 mg) by mouth 2 times daily for 10 days    Itching  - triamcinolone (ARISTOCORT HP) 0.5 % external cream; Apply topically 2 times daily      Wan Peres MD  [facility] ELAINEMANASA Lovell is a 72 year old, presenting for the following health issues:  Tick Bite (Tick bite in the middle of his back. )      HPI     Concern -   Onset: last night   Description: removed tick from back last night, must sure engorged for few days, probably wood tick, area itching a lot, [place]   Intensity: moderate  Progression of Symptoms:  Same   Therapies tried and outcome: bacitracin       Review of Systems   Constitutional, HEENT, cardiovascular, pulmonary, gi and gu systems are negative, except as otherwise noted.      Objective    BP (!) 163/87   Pulse 57   Temp 98.4  F (36.9  C) (Tympanic)   Resp 16   Ht 1.854 m (6' 1\")   Wt 88 kg (194 lb 1.6 oz)   SpO2 100%   BMI 25.61 kg/m    Body mass index is 25.61 kg/m .  Physical Exam   GENERAL: alert and no distress  RESP: no wheezes  MS: no gross musculoskeletal defects noted, no edema  SKIN: Erythematous skin involving mid back with central tick bite papo as shown below, no fluctuance, discharge or tenderness noted  NEURO: Normal strength and tone, mentation intact and speech normal  PSYCH: mentation appears normal, affect normal/bright              "

## 2023-06-20 ENCOUNTER — TRANSFERRED RECORDS (OUTPATIENT)
Dept: HEALTH INFORMATION MANAGEMENT | Facility: CLINIC | Age: 73
End: 2023-06-20
Payer: COMMERCIAL

## 2023-10-11 ENCOUNTER — TRANSFERRED RECORDS (OUTPATIENT)
Dept: HEALTH INFORMATION MANAGEMENT | Facility: CLINIC | Age: 73
End: 2023-10-11
Payer: COMMERCIAL